# Patient Record
Sex: FEMALE | Race: WHITE | NOT HISPANIC OR LATINO | URBAN - NONMETROPOLITAN AREA
[De-identification: names, ages, dates, MRNs, and addresses within clinical notes are randomized per-mention and may not be internally consistent; named-entity substitution may affect disease eponyms.]

---

## 2023-05-17 ENCOUNTER — LAB OUTSIDE AN ENCOUNTER (OUTPATIENT)
Dept: URBAN - NONMETROPOLITAN AREA CLINIC 4 | Facility: CLINIC | Age: 83
End: 2023-05-17

## 2023-05-17 ENCOUNTER — WEB ENCOUNTER (OUTPATIENT)
Dept: URBAN - NONMETROPOLITAN AREA CLINIC 4 | Facility: CLINIC | Age: 83
End: 2023-05-17

## 2023-05-17 ENCOUNTER — OFFICE VISIT (OUTPATIENT)
Dept: URBAN - NONMETROPOLITAN AREA CLINIC 4 | Facility: CLINIC | Age: 83
End: 2023-05-17
Payer: MEDICARE

## 2023-05-17 VITALS
WEIGHT: 119.6 LBS | DIASTOLIC BLOOD PRESSURE: 84 MMHG | SYSTOLIC BLOOD PRESSURE: 152 MMHG | HEIGHT: 66 IN | BODY MASS INDEX: 19.22 KG/M2 | HEART RATE: 75 BPM | TEMPERATURE: 98.1 F

## 2023-05-17 DIAGNOSIS — Z79.01 CHRONIC ANTICOAGULATION: ICD-10-CM

## 2023-05-17 DIAGNOSIS — R10.13 EPIGASTRIC PAIN: ICD-10-CM

## 2023-05-17 DIAGNOSIS — R13.10 DYSPHAGIA, UNSPECIFIED TYPE: ICD-10-CM

## 2023-05-17 DIAGNOSIS — K59.01 CONSTIPATION: ICD-10-CM

## 2023-05-17 PROBLEM — 40739000: Status: ACTIVE | Noted: 2023-05-17

## 2023-05-17 PROBLEM — 282825002: Status: ACTIVE | Noted: 2023-05-17

## 2023-05-17 PROCEDURE — 99204 OFFICE O/P NEW MOD 45 MIN: CPT | Performed by: REGISTERED NURSE

## 2023-05-17 RX ORDER — IRON HEME POLYPEPTIDE/FOLIC AC 12-1MG
1 TABLET WITH FOOD TABLET ORAL ONCE A DAY
Status: ACTIVE | COMMUNITY

## 2023-05-17 NOTE — HPI-TODAY'S VISIT:
5/17/23: Pt is an 83 yo female with PMH of GIST, Afib (on Eliquis), chronic constipation, HTN, HLD, colon polyps who was referred by Betsey Parks NP, for evaluation of constipation, incomplete defecation, incontinence and esophageal spasm.  A copy of this document will be sent to the referring provider.  Pt reports chronic throat clearing and phlegm in throat for several years. Has occasional dysphagia. Denies heartburn or reflux. Reports epigastric discomfort on an empty stomach. Denies N/V, melena. Last EGD 3/3/21 by Dr. Fontenot revealed 11 mm gastric cardia nodule. Bx c/w GIST. Hiatal hernia. She follows Dr. Laughlin.  She also reports chronic constipation with incomplete defectaion, impaction, incontinence and abdominal distention. Seen by PCP and was started on Miralax, Colace and Senna, but now currently taking Miralax qod. Denies hematochezia or abdominal pain.

## 2023-05-19 PROBLEM — 711150003: Status: ACTIVE | Noted: 2023-05-17

## 2023-05-19 PROBLEM — 420120006: Status: ACTIVE | Noted: 2023-05-17

## 2023-05-19 PROBLEM — 14760008: Status: ACTIVE | Noted: 2023-05-17

## 2023-07-17 ENCOUNTER — OFFICE VISIT (OUTPATIENT)
Dept: URBAN - NONMETROPOLITAN AREA CLINIC 4 | Facility: CLINIC | Age: 83
End: 2023-07-17
Payer: MEDICARE

## 2023-07-17 ENCOUNTER — DASHBOARD ENCOUNTERS (OUTPATIENT)
Age: 83
End: 2023-07-17

## 2023-07-17 ENCOUNTER — WEB ENCOUNTER (OUTPATIENT)
Dept: URBAN - NONMETROPOLITAN AREA CLINIC 4 | Facility: CLINIC | Age: 83
End: 2023-07-17

## 2023-07-17 ENCOUNTER — LAB OUTSIDE AN ENCOUNTER (OUTPATIENT)
Dept: URBAN - NONMETROPOLITAN AREA CLINIC 4 | Facility: CLINIC | Age: 83
End: 2023-07-17

## 2023-07-17 VITALS
TEMPERATURE: 97.5 F | WEIGHT: 120 LBS | BODY MASS INDEX: 19.29 KG/M2 | DIASTOLIC BLOOD PRESSURE: 65 MMHG | HEIGHT: 66 IN | SYSTOLIC BLOOD PRESSURE: 127 MMHG | HEART RATE: 59 BPM

## 2023-07-17 DIAGNOSIS — R13.10 DYSPHAGIA, UNSPECIFIED TYPE: ICD-10-CM

## 2023-07-17 DIAGNOSIS — R09.89 THROAT CLEARING: ICD-10-CM

## 2023-07-17 DIAGNOSIS — R10.13 EPIGASTRIC PAIN: ICD-10-CM

## 2023-07-17 DIAGNOSIS — K59.01 CONSTIPATION: ICD-10-CM

## 2023-07-17 PROCEDURE — 99214 OFFICE O/P EST MOD 30 MIN: CPT | Performed by: REGISTERED NURSE

## 2023-07-17 RX ORDER — PANTOPRAZOLE SODIUM 40 MG/1
1 TABLET TABLET, DELAYED RELEASE ORAL ONCE A DAY
Qty: 30 | Refills: 1 | OUTPATIENT
Start: 2023-07-17

## 2023-07-17 RX ORDER — IRON HEME POLYPEPTIDE/FOLIC AC 12-1MG
1 TABLET WITH FOOD TABLET ORAL ONCE A DAY
Status: ACTIVE | COMMUNITY

## 2023-07-17 NOTE — HPI-TODAY'S VISIT:
5/17/23: Pt is an 81 yo female with PMH of GIST, Afib (on Eliquis), chronic constipation, HTN, HLD, colon polyps who was referred by Betsey Parks NP, for evaluation of constipation, incomplete defecation, incontinence and esophageal spasm.  A copy of this document will be sent to the referring provider.  Pt reports chronic throat clearing and phlegm in throat for several years. Has occasional dysphagia. Denies heartburn or reflux. Reports epigastric discomfort on an empty stomach. Denies N/V, melena. Last EGD 3/3/21 by Dr. Fontenot revealed 11 mm gastric cardia nodule. Bx c/w GIST. Hiatal hernia. She follows Dr. Laughlin.  She also reports chronic constipation with incomplete defectaion, impaction, incontinence and abdominal distention. Seen by PCP and was started on Miralax, Colace and Senna, but now currently taking Miralax qod. Denies hematochezia or abdominal pain.  7/17/23: Pt RTC for f/u. Had barrium swallow study on 6/2/23. No evidence of esophageal stricture or reflux. There was evidence of poor primary peristalsis and poor initiation of secondary esophageal contractions, which may indicate a motility disorder. She continues to c/o throat clearing, phlegm in throat globus sensation and occasional dysphagia to liquids. Stools have improved somewhat. Still has occasional incontinence, but attributes that to her iron pill that has a laxative in it.

## 2023-09-21 ENCOUNTER — TELEPHONE ENCOUNTER (OUTPATIENT)
Dept: URBAN - METROPOLITAN AREA CLINIC 54 | Facility: CLINIC | Age: 83
End: 2023-09-21

## 2023-09-21 RX ORDER — PANTOPRAZOLE SODIUM 40 MG/1
1 TABLET TABLET, DELAYED RELEASE ORAL ONCE A DAY
Qty: 30 | Refills: 1
Start: 2023-07-17

## 2023-10-17 ENCOUNTER — OFFICE VISIT (OUTPATIENT)
Dept: URBAN - NONMETROPOLITAN AREA CLINIC 4 | Facility: CLINIC | Age: 83
End: 2023-10-17

## 2023-10-27 ENCOUNTER — OFFICE VISIT (OUTPATIENT)
Dept: URBAN - NONMETROPOLITAN AREA CLINIC 4 | Facility: CLINIC | Age: 83
End: 2023-10-27